# Patient Record
Sex: MALE | Race: WHITE
[De-identification: names, ages, dates, MRNs, and addresses within clinical notes are randomized per-mention and may not be internally consistent; named-entity substitution may affect disease eponyms.]

---

## 2021-02-13 ENCOUNTER — HOSPITAL ENCOUNTER (EMERGENCY)
Dept: HOSPITAL 96 - M.ERS | Age: 86
Discharge: HOME | End: 2021-02-13
Payer: MEDICARE

## 2021-02-13 VITALS — SYSTOLIC BLOOD PRESSURE: 143 MMHG | DIASTOLIC BLOOD PRESSURE: 74 MMHG

## 2021-02-13 VITALS — BODY MASS INDEX: 23.1 KG/M2 | WEIGHT: 164.99 LBS | HEIGHT: 71 IN

## 2021-02-13 DIAGNOSIS — Z88.8: ICD-10-CM

## 2021-02-13 DIAGNOSIS — K59.00: Primary | ICD-10-CM

## 2021-02-13 LAB
ABSOLUTE BASOPHILS: 0.1 THOU/UL (ref 0–0.2)
ABSOLUTE EOSINOPHILS: 0.2 THOU/UL (ref 0–0.7)
ABSOLUTE MONOCYTES: 1 THOU/UL (ref 0–1.2)
ALBUMIN SERPL-MCNC: 4.2 G/DL (ref 3.4–5)
ALP SERPL-CCNC: 47 U/L (ref 46–116)
ALT SERPL-CCNC: 19 U/L (ref 30–65)
ANION GAP SERPL CALC-SCNC: 8 MMOL/L (ref 7–16)
AST SERPL-CCNC: 19 U/L (ref 15–37)
BASOPHILS NFR BLD AUTO: 0.7 %
BILIRUB SERPL-MCNC: 0.4 MG/DL
BUN SERPL-MCNC: 29 MG/DL (ref 7–18)
CALCIUM SERPL-MCNC: 9.9 MG/DL (ref 8.5–10.1)
CHLORIDE SERPL-SCNC: 104 MMOL/L (ref 98–107)
CO2 SERPL-SCNC: 29 MMOL/L (ref 21–32)
CREAT SERPL-MCNC: 1.3 MG/DL (ref 0.6–1.3)
EOSINOPHIL NFR BLD: 2 %
GLUCOSE SERPL-MCNC: 112 MG/DL (ref 70–99)
GRANULOCYTES NFR BLD MANUAL: 74.5 %
HCT VFR BLD CALC: 37.8 % (ref 42–52)
HGB BLD-MCNC: 12.8 GM/DL (ref 14–18)
LIPASE: 176 U/L (ref 73–393)
LYMPHOCYTES # BLD: 1.2 THOU/UL (ref 0.8–5.3)
LYMPHOCYTES NFR BLD AUTO: 12.5 %
MCH RBC QN AUTO: 32.1 PG (ref 26–34)
MCHC RBC AUTO-ENTMCNC: 33.9 G/DL (ref 28–37)
MCV RBC: 94.6 FL (ref 80–100)
MONOCYTES NFR BLD: 10.3 %
MPV: 7.4 FL. (ref 7.2–11.1)
NEUTROPHILS # BLD: 7 THOU/UL (ref 1.6–8.1)
NUCLEATED RBCS: 0 /100WBC
PLATELET COUNT*: 221 THOU/UL (ref 150–400)
POTASSIUM SERPL-SCNC: 4.2 MMOL/L (ref 3.5–5.1)
PROT SERPL-MCNC: 8.2 G/DL (ref 6.4–8.2)
RBC # BLD AUTO: 4 MIL/UL (ref 4.5–6)
RDW-CV: 13.8 % (ref 10.5–14.5)
SODIUM SERPL-SCNC: 141 MMOL/L (ref 136–145)
WBC # BLD AUTO: 9.4 THOU/UL (ref 4–11)

## 2021-05-15 ENCOUNTER — HOSPITAL ENCOUNTER (OUTPATIENT)
Dept: HOSPITAL 96 - M.ERS | Age: 86
Setting detail: OBSERVATION
LOS: 1 days | Discharge: HOME | End: 2021-05-16
Attending: INTERNAL MEDICINE | Admitting: INTERNAL MEDICINE
Payer: MEDICARE

## 2021-05-15 VITALS — SYSTOLIC BLOOD PRESSURE: 172 MMHG | DIASTOLIC BLOOD PRESSURE: 99 MMHG

## 2021-05-15 VITALS — BODY MASS INDEX: 17.64 KG/M2 | HEIGHT: 70.98 IN | WEIGHT: 126 LBS

## 2021-05-15 DIAGNOSIS — R10.9: Primary | ICD-10-CM

## 2021-05-15 DIAGNOSIS — E11.9: ICD-10-CM

## 2021-05-15 DIAGNOSIS — Z79.899: ICD-10-CM

## 2021-05-15 DIAGNOSIS — Z20.822: ICD-10-CM

## 2021-05-15 DIAGNOSIS — I71.4: ICD-10-CM

## 2021-05-15 LAB
ABSOLUTE BASOPHILS: 0.1 THOU/UL (ref 0–0.2)
ABSOLUTE EOSINOPHILS: 0.2 THOU/UL (ref 0–0.7)
ABSOLUTE MONOCYTES: 0.8 THOU/UL (ref 0–1.2)
ALBUMIN SERPL-MCNC: 3.9 G/DL (ref 3.4–5)
ALP SERPL-CCNC: 51 U/L (ref 46–116)
ALT SERPL-CCNC: 14 U/L (ref 30–65)
ANION GAP SERPL CALC-SCNC: 10 MMOL/L (ref 7–16)
AST SERPL-CCNC: 13 U/L (ref 15–37)
BASOPHILS NFR BLD AUTO: 0.5 %
BILIRUB SERPL-MCNC: 0.2 MG/DL
BILIRUB UR-MCNC: NEGATIVE MG/DL
BUN SERPL-MCNC: 26 MG/DL (ref 7–18)
CALCIUM SERPL-MCNC: 9.5 MG/DL (ref 8.5–10.1)
CHLORIDE SERPL-SCNC: 103 MMOL/L (ref 98–107)
CO2 SERPL-SCNC: 29 MMOL/L (ref 21–32)
COLOR UR: YELLOW
CREAT SERPL-MCNC: 1.4 MG/DL (ref 0.6–1.3)
EOSINOPHIL NFR BLD: 1.5 %
GLUCOSE SERPL-MCNC: 138 MG/DL (ref 70–99)
GRANULOCYTES NFR BLD MANUAL: 80.2 %
HCT VFR BLD CALC: 36.4 % (ref 42–52)
HGB BLD-MCNC: 12.3 GM/DL (ref 14–18)
KETONES UR STRIP-MCNC: NEGATIVE MG/DL
LIPASE: 186 U/L (ref 73–393)
LYMPHOCYTES # BLD: 1.2 THOU/UL (ref 0.8–5.3)
LYMPHOCYTES NFR BLD AUTO: 11 %
MCH RBC QN AUTO: 31.6 PG (ref 26–34)
MCHC RBC AUTO-ENTMCNC: 33.7 G/DL (ref 28–37)
MCV RBC: 93.9 FL (ref 80–100)
MONOCYTES NFR BLD: 6.8 %
MPV: 7.1 FL. (ref 7.2–11.1)
NEUTROPHILS # BLD: 8.9 THOU/UL (ref 1.6–8.1)
NUCLEATED RBCS: 0 /100WBC
PLATELET COUNT*: 216 THOU/UL (ref 150–400)
POTASSIUM SERPL-SCNC: 4.1 MMOL/L (ref 3.5–5.1)
PROT SERPL-MCNC: 8.1 G/DL (ref 6.4–8.2)
PROT UR QL STRIP: NEGATIVE
RBC # BLD AUTO: 3.87 MIL/UL (ref 4.5–6)
RBC # UR STRIP: (no result) /UL
RDW-CV: 12.5 % (ref 10.5–14.5)
SODIUM SERPL-SCNC: 142 MMOL/L (ref 136–145)
SP GR UR STRIP: 1.01 (ref 1–1.03)
URINE CLARITY: CLEAR
URINE GLUCOSE-RANDOM: NEGATIVE
URINE LEUKOCYTES-REFLEX: NEGATIVE
URINE NITRITE-REFLEX: NEGATIVE
UROBILINOGEN UR STRIP-ACNC: 0.2 E.U./DL (ref 0.2–1)
WBC # BLD AUTO: 11.1 THOU/UL (ref 4–11)

## 2021-05-15 NOTE — EMS
[address]  [address]                    EMS Patient Care Report       
_______________________________________________________________________________
 
Name:       EBENEZER MUNROE                 Room:                      REG ER  
M.R.#:  [id number]      Account #:      [id number]  
Admission:  05/15/21     Attend Phys:                         
Discharge:               Date of Birth:  [birth year]  
         Report #: [id number]
                                                                     [phone]
_______________________________________________________________________________
THIS REPORT FOR:  //name//                      
 
Report Transmitted: 05/15/2021 19:08
EMS Care Summary
[facility]
Incident [id number] @ 05/15/2021 16:23
 
Incident Location
[address] [address]
 
Patient
EBENEZER MUNROE
Male, 86 Years
 1935
 
Patient Address
[address] [address]
 
Patient History
Urinary Tract Infection (UTI),Constipation,
 
Patient Allergies
No known allergies,
 
Patient Medications
ASA,
 
Chief Complaint
"I can't poop."
 
Disposition
Transported No Lights/[place]
 
Dispatch Reason
Abdominal Pain/Problems
 
Transported To
[facility]
 
Narrative
Dispatch: Med 1 response requested to Daija Shin for a male who is 
constipated. Med 1 copied the call and began our response to the scene. We 
arrived on scene after Avis FLEMING without incident. We were met by Avis FLEMING 
with a brief report on the situation. 
 
 
 
[address]  [address]                    EMS Patient Care Report       
_______________________________________________________________________________
 
Name:       EBENEZER MUNROE                 Room:                      REG ER  
M.ROBB#:  [id number]      Account #:      [id number]  
Admission:  05/15/21     Attend Phys:                         
Discharge:               Date of Birth:  [birth year]  
         Report #: [id number]
                                                                     [id number]
_______________________________________________________________________________
 
Chief complaint: Male sitting up right on his porch. Alert with a GCS of 15. He 
advised he can't have a bowel movement. 
 
History of present illness: Patient has chronic constipation related issues due 
to an known cause. He finds it difficult to have a bowel movement every day but 
is able to with the help of laxatives. He tried to take 4 Laxative 4 hours 
prior to calling EMS but still hasn't been able to have a bowel movement. He 
feels the urge to go but can't. He has lower abdominal pain associated with 
abdominal distention. The pain decreases with sitting down but increased with 
movement. He advised that he hasn't been drinking enough water but has had a 
normal appetite. He denied any dizziness or shortness of air. 
 
Assessment: Alert with a GCS of 15. Airway open and clear while self 
maintained. Lungs clear upon assessment with non labored breathing. Able to 
speak in full sentences without difficulty noted. Skin noted to be pink, warm 
and dry. Pulse regular felt at the radial. No outward deformities noted. Lower 
abdomen distended. No pain increase with palpation. 
 
Secondary assessment: Assessment noted in tab
 
Reason for transport: Patient requested transport to the ER for assessment.
 
Treatment: ALS assessment, vitals, ECG monitor showing sinus rhythm, IV access 
with fluid administered, transport. 
 
Summary: Patient was assisted to walk to the cot where he had a seat. He was 
covered and secured with all seat belts. He was placed into the ambulance. 
Vitals were obtained. IV access obtained with fluid administered. We began our 
transport non emergent to [facility] for further assessment. Patient 
was monitored with no acute changes in condition. Patient conversated with EMS 
the entire transport with no appearance of distress. Radio report was called in 
to the ER via med radio. Upon arrival to the ER patient was taken inside and to 
the ER bed where he was moved. Report was given to the ER nurse and care was 
then transferred. Signatures were obtained and care was transferred. Med 1 
cleared to return to service. 
 
Initial Vitals
@16:50P: 92,R: 22,BP: 180/90,Pain: 4/10,GCS: 15,Glucose: 181,SpO2: 99,Revised 
Trauma: 12, 
@17:15P: 82,R: 18,BP: 178/90,Pain: 6/10,GCS: 15,SpO2: 100,Revised Trauma: 12,
@17:01P: 77,R: 22,BP: 152/90,Pain: 6/10,GCS: 15,SpO2: 100,Revised Trauma: 12,
@16:36P: 79,R: 18,BP: 178/94,Pain: 6/10,GCS: 15,SpO2: 100,Revised Trauma: 12,
@17:21P: 85,R: 22,BP: 154/90,Pain: 6/10,GCS: 15,SpO2: 100,Revised Trauma: 12,
@16:41P: 90,R: 18,BP: 129/93,Pain: 6/10,GCS: 15,SpO2: 90,Revised Trauma: 12,
 
 
 
 
[place] [address]                    EMS Patient Care Report       
_______________________________________________________________________________
 
Name:       HARPREET MUNROEARD                 Room:                      REG ER  
MMAURILIO#:  [id number]      Account #:      [id number]  
Admission:  05/15/21     Attend Phys:                         
Discharge:               Date of Birth:  [birth year]  
         Report #: [id number]
                                                                     [id number]
_______________________________________________________________________________
Assessments
@16:38MENTAL:Person Oriented,Time Oriented,Place Oriented,Event 
Oriented,SKIN:HEENT:LUNG SOUNDS:General: Absent Bowel Sounds,Right Lower: 
Distension,Left Lower: Distension,ABDOMEN:General: Absent Bowel Sounds,Right 
Lower: Distension,Left Lower: 
Distension,PELVIS//GI:EXTREMITIES:PULSE:NEURO:@17:09MENTAL:Person 
Oriented,Time Oriented,Event Oriented,Place Oriented,SKIN:HEENT:LUNG 
SOUNDS:General: Absent Bowel Sounds,Left Lower: Distension,Right Lower: 
Distension,ABDOMEN:General: Absent Bowel Sounds,Left Lower: Distension,Right 
Lower: Distension,PELVIS//GI:EXTREMITIES:PULSE:NEURO: 
 
Impression
Abdominal Pain
 
Procedures
@16:37ALS AssessmentResponse: UnchangedSucceeded@16:51Lactated Ringers 10cc (20 
ga) Site: Forearm-LeftResponse: UnchangedSucceeded@16:54Lactated Ringers 300cc 
() Site: Forearm-LeftResponse: UnchangedSucceeded@PTASurgical Mask on 
PatientResponse: Unchanged 
 
Timeline
PTA,Surgical Mask on Patient,Response: Unchanged
16:19,Call Received
16:23,Dispatched
16:24,En Route
16:35,On Scene
16:36,At Patient
16:36,BP: 178/94 M,PULSE: 79,RR: 18 R,SPO2: 100 Ox,ETCO2:  ,BG: ,PAIN: 6,GCS: 
15, 
16:37,ALS Assessment,Response: UnchangedSucceeded,
16:41,BP: 129/93 M,PULSE: 90,RR: 18 R,SPO2: 90 Ox,ETCO2:  ,BG: ,PAIN: 6,GCS: 15,
16:47,Depart Scene
16:50,BP: 180/90 M,PULSE: 92,RR: 22 R,SPO2: 99 Ox,ETCO2:  ,B,PAIN: 4,GCS: 
15, 
16:51,Lactated Ringers 10cc 20 ga Site: Forearm-Left,Response: 
UnchangedSucceeded, 
16:54,Lactated Ringers 300cc  Site: Forearm-Left,Response: UnchangedSucceeded,
17:01,BP: 152/90 M,PULSE: 77,RR: 22 R,SPO2: 100 Ox,ETCO2:  ,BG: ,PAIN: 6,GCS: 
15, 
17:03,At Destination
17:15,BP: 178/90 M,PULSE: 82,RR: 18 R,SPO2: 100 Ox,ETCO2:  ,BG: ,PAIN: 6,GCS: 
15, 
17:21,BP: 154/90 M,PULSE: 85,RR: 22 R,SPO2: 100 Ox,ETCO2:  ,BG: ,PAIN: 6,GCS: 
15, 
18:35,Call [place] [address]                    EMS Patient Care Report       
_______________________________________________________________________________
 
Name:       EBENEZER MUNROE                 Room:                      REG ER  
M.R.#:  [id number]      Account #:      [id number]  
Admission:  05/15/21     Attend Phys:                         
Discharge:               Date of Birth:  [birth year]  
         Report #: [id number]
                                                                     [phone]
_______________________________________________________________________________
Disclaimer
v1.1     Copyright  [employer]
This EMS Care Summary contains data elements from the applicable legal record 
(which may be displayed differently). It is designed to provide pertinent 
information for the following purposes: continuity of care, clinical quality, 
and state data reporting. The complete legal record is available to ED staff 
and administrators of the receiving hospital in [facility]'s Patient Tracker. All data 
is provided "as is."

## 2021-05-16 VITALS — SYSTOLIC BLOOD PRESSURE: 132 MMHG | DIASTOLIC BLOOD PRESSURE: 66 MMHG

## 2021-05-16 VITALS — SYSTOLIC BLOOD PRESSURE: 138 MMHG | DIASTOLIC BLOOD PRESSURE: 78 MMHG

## 2021-05-16 VITALS — SYSTOLIC BLOOD PRESSURE: 126 MMHG | DIASTOLIC BLOOD PRESSURE: 57 MMHG

## 2021-05-17 NOTE — EKG
[place] [address]
Phone:  [phone]                     ELECTROCARDIOGRAM REPORT      
_______________________________________________________________________________
 
Name:         EBENEZER MUNROE                Room:          [address]
M.R.#:    [id number]     Account #:     [id number]  
Admission:    05/15/21    Attend Phys:   Andrew Sarah, 
Discharge:    21    Date of Birth: [birth year]  
Date of Service: 05/15/21 1743  Report #:      [id number]
        [id number]
_______________________________________________________________________________
THIS REPORT FOR:  //name//                      
 
                         [facility] ED
                                       
Test Date:    2021-05-15               Test Time:    17:43:25
Pat Name:     EBENEZER MUNROE           Department:   
Patient ID:   [id number]            Room:         [facility]
Gender:       M                        Technician:   
:          1935               Requested By: Quirino Rasmussen
Order Number: [id number] MD:   Isaiah Sorensen
                                 Measurements
Intervals                              Axis          
Rate:         69                       P:            66
SD:           223                      QRS:          -38
QRSD:         120                      T:            69
QT:           399                                    
QTc:          428                                    
                           Interpretive Statements
Sinus rhythm
Atrial premature complex
Prolonged SD interval
Nonspecific IVCD with LAD
ST elevation, consider early repolarization
Baseline wander in lead(s) V4
No previous ECG available for comparison
Electronically Signed On 2021 10:19:41 CDT by Isaiah Sorensen
[email or web address]
 
 
 
 
 
 
 
 
 
 
 
 
 
 
 
 
 
  <ELECTRONICALLY SIGNED>
                                           By: Isaiah Sorensen MD, [facility]      
  21     [id number]
D: 05/15/21 1743   _____________________________________
T: 05/15/21 1743   Isaiah Sorensen MD, [facility]        /EPI